# Patient Record
Sex: FEMALE | Race: WHITE | NOT HISPANIC OR LATINO | Employment: FULL TIME | URBAN - METROPOLITAN AREA
[De-identification: names, ages, dates, MRNs, and addresses within clinical notes are randomized per-mention and may not be internally consistent; named-entity substitution may affect disease eponyms.]

---

## 2017-09-21 ENCOUNTER — ANESTHESIA EVENT (OUTPATIENT)
Dept: GASTROENTEROLOGY | Facility: HOSPITAL | Age: 52
End: 2017-09-21
Payer: COMMERCIAL

## 2017-09-21 RX ORDER — MELATONIN
1000 DAILY
COMMUNITY
End: 2018-12-18

## 2017-09-21 RX ORDER — CABERGOLINE 0.5 MG/1
0.25 TABLET ORAL 2 TIMES WEEKLY
COMMUNITY
End: 2018-12-18

## 2017-09-21 RX ORDER — NICOTINE POLACRILEX 2 MG
GUM BUCCAL
COMMUNITY
End: 2018-12-18

## 2017-09-21 RX ORDER — POLYETHYLENE GLYCOL 3350 17 G/17G
17 POWDER, FOR SOLUTION ORAL DAILY
COMMUNITY
End: 2018-12-18

## 2017-09-21 RX ORDER — LANSOPRAZOLE 30 MG/1
30 CAPSULE, DELAYED RELEASE ORAL DAILY
COMMUNITY
End: 2019-12-19 | Stop reason: ALTCHOICE

## 2017-09-22 ENCOUNTER — HOSPITAL ENCOUNTER (OUTPATIENT)
Facility: HOSPITAL | Age: 52
Setting detail: OUTPATIENT SURGERY
Discharge: HOME | End: 2017-09-22
Attending: COLON & RECTAL SURGERY | Admitting: COLON & RECTAL SURGERY
Payer: COMMERCIAL

## 2017-09-22 ENCOUNTER — ANESTHESIA (OUTPATIENT)
Dept: GASTROENTEROLOGY | Facility: HOSPITAL | Age: 52
End: 2017-09-22
Payer: COMMERCIAL

## 2017-09-22 VITALS
BODY MASS INDEX: 24.25 KG/M2 | DIASTOLIC BLOOD PRESSURE: 51 MMHG | HEART RATE: 71 BPM | SYSTOLIC BLOOD PRESSURE: 93 MMHG | HEIGHT: 68 IN | OXYGEN SATURATION: 100 % | WEIGHT: 160 LBS | TEMPERATURE: 99.3 F | RESPIRATION RATE: 18 BRPM

## 2017-09-22 DIAGNOSIS — Z12.11 ENCOUNTER FOR SCREENING FOR MALIGNANT NEOPLASM OF COLON: ICD-10-CM

## 2017-09-22 LAB — EXT PREGNANCY TEST URINE: NEGATIVE

## 2017-09-22 PROCEDURE — 81025 URINE PREGNANCY TEST: CPT | Performed by: ANESTHESIOLOGY

## 2017-09-22 PROCEDURE — 88305 TISSUE EXAM BY PATHOLOGIST: CPT | Performed by: COLON & RECTAL SURGERY

## 2017-09-22 RX ORDER — PROPOFOL 10 MG/ML
INJECTION, EMULSION INTRAVENOUS AS NEEDED
Status: DISCONTINUED | OUTPATIENT
Start: 2017-09-22 | End: 2017-09-22 | Stop reason: SURG

## 2017-09-22 RX ORDER — SODIUM CHLORIDE 9 MG/ML
125 INJECTION, SOLUTION INTRAVENOUS CONTINUOUS
Status: DISCONTINUED | OUTPATIENT
Start: 2017-09-22 | End: 2017-09-22 | Stop reason: HOSPADM

## 2017-09-22 RX ADMIN — PROPOFOL 100 MG: 10 INJECTION, EMULSION INTRAVENOUS at 08:04

## 2017-09-22 RX ADMIN — PROPOFOL 50 MG: 10 INJECTION, EMULSION INTRAVENOUS at 08:07

## 2017-09-22 RX ADMIN — SODIUM CHLORIDE 125 ML/HR: 0.9 INJECTION, SOLUTION INTRAVENOUS at 07:30

## 2017-09-22 RX ADMIN — PROPOFOL 50 MG: 10 INJECTION, EMULSION INTRAVENOUS at 08:12

## 2017-11-14 ENCOUNTER — ALLSCRIPTS OFFICE VISIT (OUTPATIENT)
Dept: OTHER | Facility: OTHER | Age: 52
End: 2017-11-14

## 2017-11-14 ENCOUNTER — LAB REQUISITION (OUTPATIENT)
Dept: LAB | Facility: HOSPITAL | Age: 52
End: 2017-11-14
Payer: COMMERCIAL

## 2017-11-14 DIAGNOSIS — Z01.419 ENCOUNTER FOR GYNECOLOGICAL EXAMINATION WITHOUT ABNORMAL FINDING: ICD-10-CM

## 2017-11-14 DIAGNOSIS — Z12.39 ENCOUNTER FOR OTHER SCREENING FOR MALIGNANT NEOPLASM OF BREAST: ICD-10-CM

## 2017-11-14 PROCEDURE — 87624 HPV HI-RISK TYP POOLED RSLT: CPT | Performed by: OBSTETRICS & GYNECOLOGY

## 2017-11-14 PROCEDURE — G0145 SCR C/V CYTO,THINLAYER,RESCR: HCPCS | Performed by: OBSTETRICS & GYNECOLOGY

## 2017-11-15 NOTE — PROGRESS NOTES
Assessment    1  Gynecologic exam normal (V72 31) (Z01 419)   2  Encounter for other screening for malignant neoplasm of breast (V76 19) (Z12 39)    Plan  Encounter for other screening for malignant neoplasm of breast    · MAMMO SCREENING BILATERAL W 3D & CAD; Status:Hold For - Scheduling; Requestedfor:[id number];    Perform:[facility]; OUS:[id number]; Ordered;for other screening for malignant neoplasm of breast; Ordered By:Escobar Holt;  Gynecologic exam normal    · (1) THIN PREP PAP WITH IMAGING; Status: In Progress - Specimen/DataCollected,Retrospective By Protocol Authorization;   Done: [id number]   Perform:[facility] In Office Collection; JLN:[id number]; Last Updated By:Nina Sanchez; 11/14/2017 9:59:07 AM;Ordered;exam normal; Ordered By:Escobar Holt; Maturation index required? : No  HPV? : Regardless of Interpretation   · Follow-up visit in 1 year Evaluation and Treatment  Follow-up  Status: Hold For -Scheduling  Requested for: [id number]   Ordered;Gynecologic exam normal; Ordered By: Jessenia Hayes Performed:  Due: [id number]    Discussion/Summary  Currently, she eats a healthy diet and has an adequate exercise regimen  the risks and benefits of cervical cancer screening were discussed cervical cancer screening is current Breast cancer screening: the risks and benefits of breast cancer screening were discussed and mammogram has been ordered  Colorectal cancer screening: the risks and benefits of colorectal cancer screening were discussed and colorectal cancer screening is current  Advice and education were given regarding aerobic exercise, weight bearing exercise, calcium supplements and vitamin D supplements       Normal gynecologic examto the office in 1 year'17Dostinex 0 5 mg - 1/2 twice/wk- my be d/c'ed p MRILo LoEstrin - d/c, low threshold to restartq monD Mammo1,000 mg/d with Vit D3/wkasymmetry is due to scarring from the appendectomy- drains were in place and probably caused puckng of the incision- Plastic Surgery consultation done, decided not to pursue scar revision  reviewed 12/2/16  Chief Complaint  pt here for yearly exam  LMP unknown last pap 2014 last mammo 2016  due for co-testing today      History of Present Illness  HPI: Mckay Leon year for an annual visit  is without complaints  MRI will be done of the pituitary next week  Prolactin level was only 1  If the MRI is normal she will discontinue Dostinex  is due also to discontinue Lo Loestrin  She has one period a year since being on the pill  Review of Systems   Constitutional: No fever, no chills, feels well, no tiredness, no recent weight gain or loss-- and-- as noted in HPI   ENT: no ear ache, no loss of hearing, no nosebleeds or nasal discharge, no sore throat or hoarseness  Cardiovascular: no complaints of slow or fast heart rate, no chest pain, no palpitations, no leg claudication or lower extremity edema  Respiratory: no complaints of shortness of breath, no wheezing, no dyspnea on exertion, no orthopnea or PND  Gastrointestinal: constipation-- and-- Takes half a MiraLAX qod, but-- no abdominal pain,-- no nausea,-- no vomiting,-- no diarrhea-- and-- no blood in stools  Genitourinary: Coitus is once a week without problems  No hot flashes or night sweats  uses hyaluronic acid as a lubricant, but-- no complaints of dysuria, no incontinence, no pelvic pain, no dysmenorrhea, no vaginal discharge or abnormal vaginal bleeding  Active Problems    1  Encounter for other screening for malignant neoplasm of breast (V76 19) (Z12 39)   2  Gynecologic exam normal (V72 31) (Z01 419)   3   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)    Past Medical History     Menarche 14 Rupture Appendix- Appendectomy '76 Amenorrhea/Infertility- Prolactinoma, conceive after Parlodel G2, P1- Comp Ab '95, C/S FTP, Prolonged 2nd Stage, Failed Vacuum  8-13 '97 L Torn Meniscus- Arthroscopy 9/06 Nephrolithiasis- lithotripsy, stent  2/07 Rectal Fissure repaired 5/12 Gastritis- EGD Colonoscopy - polyps- repeat 3 ys   The active problems and past medical history were reviewed and updated today  Surgical History     · History of Appendectomy   · History of  Section   · History of Knee Arthroscopy With Lateral Meniscus Repair    Family History  Mother    · Family history of Spinal disorder  Sister    · Family history of congestive heart failure (V17 49) (Z82 49)     MGM- Breast Ca 81, HD MGF- HD PGM- HD M- RA, Cervical Spine disease- Abn Mammo- being evaluated now  MA- Breast Ca 60's S- RA, CHF- Viral Cardiomyopathy 39 F- d Pulmonary Fibrosis 68, Colon Polyps   Social History     · Acute alcohol use (Z72 89)   · Currently sexually active   · Daily caffeine consumption   · Exercises regularly   · Never smoker   · No drug use   · One child  The social history was reviewed and updated today  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    [address] (adopted) learning to drive      Current Meds   1  Biotin 10 MG Oral Tablet; Therapy: (Recorded:2016) to Recorded   2  Cabergoline 0 5 MG Oral Tablet; TAKE 1/2 TABLET 2 TIMES WEEKLY; Therapy: 59CBV6290 to (Last Rx:2016)  Requested for: 21LKZ7369 Ordered   3  Dostinex 0 5 MG TABS; Therapy: (Recorded:2016) to Recorded   4  Hyaluronate Sodium Powder; Therapy: (Recorded:2016) to Recorded   5  Lo Loestrin Fe 1 MG-10 MCG / 10 MCG Oral Tablet; TAKE 1 TABLET DAILY; Therapy: 21NZY5601 to (Angy Martel)  Requested for: 05CQU8834; Last Rx:2016 Ordered   6  Lo Loestrin Fe 1 MG-10 MCG / 10 MCG Oral Tablet; Therapy: (Recorded:2016) to Recorded   7  MiraLax Oral Packet; Therapy: (Recorded:2016) to Recorded   8  Vitamin D3 CAPS; Therapy: (Recorded:2016) to Recorded   9  Zyrtec 10 MG TABS; Therapy: (Recorded:2016) to Recorded    Allergies  1   No Known Drug Allergies    Vitals   Recorded: 71OTY7424 10:06AM   Heart Rate 82 Systolic 931   Diastolic 58   Height 5 ft 8 in   Weight 164 lb 2 oz   BMI Calculated 24 96   BSA Calculated 1 88   O2 Saturation 98   LMP unknown       Physical Exam   Constitutional  General appearance: No acute distress, well appearing and well nourished  Neck  Neck: Normal, supple, trachea midline, no masses  Thyroid: Normal, no thyromegaly  Pulmonary  Respiratory effort: No increased work of breathing or signs of respiratory distress  Auscultation of lungs: Clear to auscultation  Cardiovascular  Auscultation of heart: Normal rate and rhythm, normal S1 and S2, no murmurs  Peripheral vascular exam: Normal pulses Throughout  Genitourinary  External genitalia: Normal and no lesions appreciated  Vagina: Normal, no lesions or dryness appreciated  Urethra: Normal    Urethral meatus: Normal    Bladder: Normal, soft, non-tender and no prolapse or masses appreciated  Cervix: Normal, no palpable masses  Uterus: Normal, non-tender, not enlarged, and no palpable masses  -- RV, small  Adnexa/parametria: Normal, non-tender and no fullness or masses appreciated  Anus, perineum, and rectum: Normal sphincter tone, no masses, and no prolapse  Chest  Breasts: Normal and no dimpling or skin changes noted  Abdomen  Abdomen: Normal, non-tender, and no organomegaly noted  Liver and spleen: No hepatomegaly or splenomegaly  Examination for hernias: No hernias appreciated  Lymphatic  Palpation of lymph nodes in neck, axillae, groin and/or other locations: No lymphadenopathy or masses noted  Skin  Skin and subcutaneous tissue: Abnormal  -- Depression of skin just below appendectomy scar    Palpation of skin and subcutaneous tissue: Normal    Psychiatric  Orientation to person, place, and time: Normal    Mood and affect: Normal        Signatures   Electronically signed by : DUSTIN Flowers ; Nov 14 2017 10:25AM EST                       (Author)

## 2017-11-16 LAB — HPV RRNA GENITAL QL NAA+PROBE: NORMAL

## 2017-11-20 ENCOUNTER — GENERIC CONVERSION - ENCOUNTER (OUTPATIENT)
Dept: OTHER | Facility: OTHER | Age: 52
End: 2017-11-20

## 2017-11-20 LAB
LAB AP GYN PRIMARY INTERPRETATION: NORMAL
Lab: NORMAL

## 2017-12-22 ENCOUNTER — GENERIC CONVERSION - ENCOUNTER (OUTPATIENT)
Dept: GYNECOLOGY | Facility: CLINIC | Age: 52
End: 2017-12-22

## 2018-01-13 VITALS
WEIGHT: 164.13 LBS | HEART RATE: 82 BPM | BODY MASS INDEX: 24.88 KG/M2 | DIASTOLIC BLOOD PRESSURE: 58 MMHG | HEIGHT: 68 IN | OXYGEN SATURATION: 98 % | SYSTOLIC BLOOD PRESSURE: 114 MMHG

## 2018-01-15 NOTE — RESULT NOTES
Verified Results  (1) THIN PREP PAP WITH IMAGING [id number] 09:58AM Genie Null Order Number: [id number]     Test Name Result Flag Reference   LAB AP CASE REPORT (Report)     Gynecologic Cytology Report            Case: [id number]                  Authorizing Provider: Da Benitez MD     Collected:      11/14/2017 0958        First Screen:     DAMARIS Baird Received:      11/15/2017 1345        Specimen:  LIQUID-BASED PAP, SCREENING, Cervix   HPV HIGH RISK RESULT (Report)     HPV, High Risk: HPV NEG, HPV16 NEG, HPV18 NEG      Other High Risk HPV Negative, HPV 16 Negative, HPV 18 Negative  HPV types: 16,18,31,33,35,39,45,51,52,56,58,59,66 and 68 DNA are undetectable or below the pre-set threshold  [employer] FDA approved Vishal 4800 is utilized with strict adherence to the manufacturers instruction  manual to test for the presence of High-Risk HPV DNA, as well as HPV 16 and HPV 18  This instrument  has been validated by our laboratory and/or by the   A negative result does not preclude the presence of HPV infection because results depend on adequate  specimen collection, absence of inhibitors and sufficient DNA to be detected  Additionally, HPV negative  results are not intended to prevent women from proceeding to colposcopy if clinically warranted  Positive HPV test results indicate the presence of any one or more of the high risk types, but since patients  are often co-infected with low-risk types it does not rule out the presence of low-risk types in patients  with mixed infections  LAB AP GYN PRIMARY INTERPRETATION      Negative for intraepithelial lesion or malignancy  Electronically signed by DAMARIS Baird on 11/20/2017 at 2:51 PM   LAB AP GYN SPECIMEN ADEQUACY      Satisfactory for evaluation  Endocervical/transformation zone component present     LAB AP GYN ADDITIONAL INFORMATION (Report)     [employer] FDA approved ,  and ThinPrep Imaging System are   utilized with strict adherence to the 's instruction manual to   prepare gynecologic and non-gynecologic cytology specimens for the   production of ThinPrep slides as well as for gynecologic ThinPrep imaging  These processes have been validated by our laboratory and/or by the     The Pap test is not a diagnostic procedure and should not be used as the   sole means to detect cervical cancer  It is only a screening procedure to   aid in the detection of cervical cancer and its precursors  Both   false-negative and false-positive results have been experienced  Your   patient's test result should be interpreted in this context together with   the history and clinical findings  LAB AP LMP n/a     n/a   LAB AP CASE REPORT (Report)     Gynecologic Cytology Report            Case: [id number]                  Authorizing Provider: Génesis Oquendo MD     Collected:      11/14/2017 0958        First Screen:     DAMARIS [place] Received:      11/15/2017 1345        Specimen:  LIQUID-BASED PAP, SCREENING, Cervix   HPV HIGH RISK RESULT (Report)     HPV, High Risk: HPV NEG, HPV16 NEG, HPV18 NEG      Other High Risk HPV Negative, HPV 16 Negative, HPV 18 Negative  HPV types: 16,18,31,33,35,39,45,51,52,56,58,59,66 and 68 DNA are undetectable or below the pre-set threshold  [employer] FDA approved Vishal 4800 is utilized with strict adherence to the manufacturers instruction  manual to test for the presence of High-Risk HPV DNA, as well as HPV 16 and HPV 18  This instrument  has been validated by our laboratory and/or by the   A negative result does not preclude the presence of HPV infection because results depend on adequate  specimen collection, absence of inhibitors and sufficient DNA to be detected  Additionally, HPV negative  results are not intended to prevent women from proceeding to colposcopy if clinically warranted    Positive HPV test results indicate the presence of any one or more of the high risk types, but since patients  are often co-infected with low-risk types it does not rule out the presence of low-risk types in patients  with mixed infections  LAB AP GYN PRIMARY INTERPRETATION      Negative for intraepithelial lesion or malignancy  Electronically signed by DAMARIS River on 11/20/2017 at 2:51 PM   LAB AP GYN SPECIMEN ADEQUACY      Satisfactory for evaluation  Endocervical/transformation zone component present  LAB AP GYN ADDITIONAL INFORMATION (Report)     [employer]'s FDA approved ,  and ThinPrep Imaging System are   utilized with strict adherence to the 's instruction manual to   prepare gynecologic and non-gynecologic cytology specimens for the   production of ThinPrep slides as well as for gynecologic ThinPrep imaging  These processes have been validated by our laboratory and/or by the     The Pap test is not a diagnostic procedure and should not be used as the   sole means to detect cervical cancer  It is only a screening procedure to   aid in the detection of cervical cancer and its precursors  Both   false-negative and false-positive results have been experienced  Your   patient's test result should be interpreted in this context together with   the history and clinical findings     LAB AP LMP n/a     n/a

## 2018-12-17 PROBLEM — Z12.31 ENCOUNTER FOR SCREENING MAMMOGRAM FOR BREAST CANCER: Status: ACTIVE | Noted: 2018-12-17

## 2018-12-17 PROBLEM — Z01.419 ENCOUNTER FOR ANNUAL ROUTINE GYNECOLOGICAL EXAMINATION: Status: ACTIVE | Noted: 2018-12-17
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
## 2018-12-18 ENCOUNTER — ANNUAL EXAM (OUTPATIENT)
Dept: GYNECOLOGY | Facility: CLINIC | Age: 53
End: 2018-12-18
Payer: COMMERCIAL

## 2018-12-18 VITALS
WEIGHT: 169 LBS | HEIGHT: 68 IN | BODY MASS INDEX: 25.61 KG/M2 | SYSTOLIC BLOOD PRESSURE: 100 MMHG | HEART RATE: 93 BPM | DIASTOLIC BLOOD PRESSURE: 60 MMHG

## 2018-12-18 DIAGNOSIS — Z12.31 ENCOUNTER FOR SCREENING MAMMOGRAM FOR BREAST CANCER: ICD-10-CM

## 2018-12-18 DIAGNOSIS — Z01.419 ENCOUNTER FOR ANNUAL ROUTINE GYNECOLOGICAL EXAMINATION: Primary | ICD-10-CM

## 2018-12-18 PROCEDURE — 99396 PREV VISIT EST AGE 40-64: CPT | Performed by: OBSTETRICS & GYNECOLOGY

## 2018-12-18 RX ORDER — BIOTIN 10000 MCG
CAPSULE ORAL
COMMUNITY
End: 2018-12-18

## 2018-12-18 RX ORDER — DIPHENOXYLATE HYDROCHLORIDE AND ATROPINE SULFATE 2.5; .025 MG/1; MG/1
1 TABLET ORAL DAILY
COMMUNITY

## 2018-12-18 RX ORDER — CETIRIZINE HYDROCHLORIDE 10 MG/1
TABLET ORAL
COMMUNITY

## 2018-12-18 RX ORDER — HYALURONATE SODIUM 100 %
POWDER (GRAM) MISCELLANEOUS
COMMUNITY
End: 2018-12-18

## 2018-12-18 RX ORDER — BIOTIN 1 MG
TABLET ORAL
COMMUNITY
End: 2018-12-18

## 2018-12-18 RX ORDER — CHLORAL HYDRATE 500 MG
4 CAPSULE ORAL DAILY
COMMUNITY

## 2018-12-18 RX ORDER — CABERGOLINE 0.5 MG/1
TABLET ORAL
COMMUNITY
End: 2018-12-18

## 2019-07-08 ENCOUNTER — TELEPHONE (OUTPATIENT)
Dept: GYNECOLOGY | Facility: CLINIC | Age: 54
End: 2019-07-08

## 2019-07-08 NOTE — TELEPHONE ENCOUNTER
P  AM LM   Unlikely to be able to conceive but to be on the safe side I would just use withdrawal which would solve the problem with the condom  Usually recommend contraception until a full year of not having periods

## 2019-07-08 NOTE — TELEPHONE ENCOUNTER
Patient would like to speak with you, was diagnosed with Sjogrens syndrome and wanted to know if you thought she still needed to use a form of birth control? Uses condoms and with her syndrome this makes it hard  Hasn't had a period in 3 months and has occasional hot flashes  Please call
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
## 2019-12-19 ENCOUNTER — ANNUAL EXAM (OUTPATIENT)
Dept: GYNECOLOGY | Facility: CLINIC | Age: 54
End: 2019-12-19
Payer: COMMERCIAL

## 2019-12-19 VITALS
BODY MASS INDEX: 25.91 KG/M2 | DIASTOLIC BLOOD PRESSURE: 60 MMHG | WEIGHT: 171 LBS | SYSTOLIC BLOOD PRESSURE: 110 MMHG | HEIGHT: 68 IN

## 2019-12-19 DIAGNOSIS — Z12.31 ENCOUNTER FOR SCREENING MAMMOGRAM FOR BREAST CANCER: ICD-10-CM

## 2019-12-19 DIAGNOSIS — Z01.419 ENCOUNTER FOR ANNUAL ROUTINE GYNECOLOGICAL EXAMINATION: Primary | ICD-10-CM

## 2019-12-19 DIAGNOSIS — Z86.39 HISTORY OF HYPERPROLACTINEMIA: ICD-10-CM

## 2019-12-19 PROCEDURE — 99396 PREV VISIT EST AGE 40-64: CPT | Performed by: OBSTETRICS & GYNECOLOGY

## 2019-12-19 RX ORDER — LEVOFLOXACIN 500 MG/1
TABLET, FILM COATED ORAL
COMMUNITY
End: 2019-12-19 | Stop reason: ALTCHOICE

## 2019-12-19 RX ORDER — SODIUM FLUORIDE 5 MG/G
GEL, DENTIFRICE DENTAL
COMMUNITY
End: 2022-04-14

## 2019-12-19 RX ORDER — ESOMEPRAZOLE MAGNESIUM 40 MG/1
CAPSULE, DELAYED RELEASE ORAL
COMMUNITY
End: 2019-12-19

## 2019-12-19 RX ORDER — METHYLPREDNISOLONE ACETATE 40 MG/ML
1 INJECTION, SUSPENSION INTRA-ARTICULAR; INTRALESIONAL; INTRAMUSCULAR; SOFT TISSUE
COMMUNITY
End: 2019-12-19 | Stop reason: ALTCHOICE

## 2019-12-19 RX ORDER — CLOBETASOL PROPIONATE 0.46 MG/ML
SOLUTION TOPICAL
COMMUNITY
Start: 2019-12-17

## 2019-12-19 RX ORDER — FAMOTIDINE 40 MG/1
TABLET, FILM COATED ORAL
COMMUNITY

## 2019-12-19 RX ORDER — CYCLOSPORINE 0.5 MG/ML
EMULSION OPHTHALMIC
COMMUNITY

## 2019-12-19 RX ORDER — MONTELUKAST SODIUM 10 MG/1
TABLET ORAL
COMMUNITY
End: 2019-12-19 | Stop reason: ALTCHOICE

## 2019-12-19 RX ORDER — ESOMEPRAZOLE MAGNESIUM 40 MG/1
40 CAPSULE, DELAYED RELEASE ORAL
Refills: 2 | COMMUNITY
Start: 2019-10-07 | End: 2019-12-19 | Stop reason: ALTCHOICE

## 2019-12-19 RX ORDER — OXYCODONE HYDROCHLORIDE AND ACETAMINOPHEN 5; 325 MG/1; MG/1
TABLET ORAL
COMMUNITY
End: 2019-12-19 | Stop reason: ALTCHOICE

## 2019-12-31 DIAGNOSIS — Z12.31 ENCOUNTER FOR SCREENING MAMMOGRAM FOR BREAST CANCER: ICD-10-CM

## 2020-12-15 DIAGNOSIS — Z12.31 ENCOUNTER FOR SCREENING MAMMOGRAM FOR MALIGNANT NEOPLASM OF BREAST: Primary | ICD-10-CM

## 2021-01-05 DIAGNOSIS — Z12.31 ENCOUNTER FOR SCREENING MAMMOGRAM FOR MALIGNANT NEOPLASM OF BREAST: ICD-10-CM
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
## 2021-03-04 ENCOUNTER — ANNUAL EXAM (OUTPATIENT)
Dept: OBGYN CLINIC | Facility: CLINIC | Age: 56
End: 2021-03-04
Payer: COMMERCIAL

## 2021-03-04 VITALS
BODY MASS INDEX: 25.76 KG/M2 | SYSTOLIC BLOOD PRESSURE: 114 MMHG | WEIGHT: 170 LBS | DIASTOLIC BLOOD PRESSURE: 60 MMHG | HEIGHT: 68 IN

## 2021-03-04 DIAGNOSIS — Z01.419 ENCOUNTER FOR ANNUAL ROUTINE GYNECOLOGICAL EXAMINATION: Primary | ICD-10-CM

## 2021-03-04 DIAGNOSIS — Z12.31 ENCOUNTER FOR SCREENING MAMMOGRAM FOR BREAST CANCER: ICD-10-CM

## 2021-03-04 DIAGNOSIS — Z86.39 HISTORY OF HYPERPROLACTINEMIA: ICD-10-CM

## 2021-03-04 PROCEDURE — 99396 PREV VISIT EST AGE 40-64: CPT | Performed by: OBSTETRICS & GYNECOLOGY

## 2022-01-08 ENCOUNTER — NEW PATIENT (OUTPATIENT)
Dept: URBAN - METROPOLITAN AREA CLINIC 6 | Facility: CLINIC | Age: 57
End: 2022-01-08

## 2022-01-08 DIAGNOSIS — H25.813: ICD-10-CM

## 2022-01-08 DIAGNOSIS — H53.19: ICD-10-CM

## 2022-01-08 PROCEDURE — 92004 COMPRE OPH EXAM NEW PT 1/>: CPT

## 2022-01-08 PROCEDURE — 92250 FUNDUS PHOTOGRAPHY W/I&R: CPT

## 2022-01-12 ASSESSMENT — TONOMETRY
OS_IOP_MMHG: 14
OD_IOP_MMHG: 14

## 2022-01-12 ASSESSMENT — VISUAL ACUITY
OD_SC: 20/25
OS_SC: 20/30

## 2022-04-13 PROBLEM — Z86.39 HISTORY OF HYPERPROLACTINEMIA: Status: ACTIVE | Noted: 2022-04-13
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
## 2022-04-14 ENCOUNTER — ANNUAL EXAM (OUTPATIENT)
Dept: OBGYN CLINIC | Facility: CLINIC | Age: 57
End: 2022-04-14
Payer: COMMERCIAL

## 2022-04-14 VITALS
HEIGHT: 68 IN | BODY MASS INDEX: 25.94 KG/M2 | DIASTOLIC BLOOD PRESSURE: 60 MMHG | WEIGHT: 171.2 LBS | SYSTOLIC BLOOD PRESSURE: 104 MMHG

## 2022-04-14 DIAGNOSIS — Z01.419 ENCOUNTER FOR ANNUAL ROUTINE GYNECOLOGICAL EXAMINATION: Primary | ICD-10-CM

## 2022-04-14 DIAGNOSIS — N95.2 ATROPHIC VAGINITIS: ICD-10-CM

## 2022-04-14 DIAGNOSIS — Z12.31 ENCOUNTER FOR SCREENING MAMMOGRAM FOR BREAST CANCER: ICD-10-CM

## 2022-04-14 DIAGNOSIS — Z12.4 SCREENING FOR CERVICAL CANCER: ICD-10-CM

## 2022-04-14 DIAGNOSIS — Z86.39 HISTORY OF HYPERPROLACTINEMIA: ICD-10-CM

## 2022-04-14 DIAGNOSIS — N94.10 DYSPAREUNIA IN FEMALE: ICD-10-CM

## 2022-04-14 DIAGNOSIS — L90.0 LICHEN SCLEROSUS ET ATROPHICUS: ICD-10-CM

## 2022-04-14 PROCEDURE — G0145 SCR C/V CYTO,THINLAYER,RESCR: HCPCS | Performed by: OBSTETRICS & GYNECOLOGY

## 2022-04-14 PROCEDURE — 99396 PREV VISIT EST AGE 40-64: CPT | Performed by: OBSTETRICS & GYNECOLOGY

## 2022-04-14 PROCEDURE — G0476 HPV COMBO ASSAY CA SCREEN: HCPCS | Performed by: OBSTETRICS & GYNECOLOGY

## 2022-04-14 RX ORDER — ESTRADIOL 0.1 MG/G
1 CREAM VAGINAL 2 TIMES WEEKLY
Qty: 42.5 G | Refills: 3 | Status: SHIPPED | OUTPATIENT
Start: 2022-04-14

## 2022-04-15 LAB
HPV HR 12 DNA CVX QL NAA+PROBE: NEGATIVE
HPV16 DNA CVX QL NAA+PROBE: NEGATIVE
HPV18 DNA CVX QL NAA+PROBE: NEGATIVE

## 2022-04-21 LAB
LAB AP GYN PRIMARY INTERPRETATION: NORMAL
Lab: NORMAL

## 2022-04-22 ENCOUNTER — TELEPHONE (OUTPATIENT)
Dept: OBGYN CLINIC | Facility: CLINIC | Age: 57
End: 2022-04-22

## 2022-04-22 NOTE — TELEPHONE ENCOUNTER
----- Message from Mare Rodarte MD sent at 4/21/2022  4:17 PM EDT -----  Results are normal  Please notify patient

## 2022-08-25 RX ORDER — SODIUM CHLORIDE, SODIUM LACTATE, POTASSIUM CHLORIDE, CALCIUM CHLORIDE 600; 310; 30; 20 MG/100ML; MG/100ML; MG/100ML; MG/100ML
125 INJECTION, SOLUTION INTRAVENOUS CONTINUOUS
Status: CANCELLED | OUTPATIENT
Start: 2022-08-25

## 2022-08-25 RX ORDER — LIDOCAINE HYDROCHLORIDE 10 MG/ML
0.5 INJECTION, SOLUTION EPIDURAL; INFILTRATION; INTRACAUDAL; PERINEURAL ONCE AS NEEDED
Status: CANCELLED | OUTPATIENT
Start: 2022-08-25

## 2022-08-26 ENCOUNTER — ANESTHESIA (OUTPATIENT)
Dept: GASTROENTEROLOGY | Facility: HOSPITAL | Age: 57
End: 2022-08-26

## 2022-08-26 ENCOUNTER — ANESTHESIA EVENT (OUTPATIENT)
Dept: GASTROENTEROLOGY | Facility: HOSPITAL | Age: 57
End: 2022-08-26

## 2022-08-26 ENCOUNTER — HOSPITAL ENCOUNTER (OUTPATIENT)
Dept: GASTROENTEROLOGY | Facility: HOSPITAL | Age: 57
Setting detail: OUTPATIENT SURGERY
End: 2022-08-26
Attending: COLON & RECTAL SURGERY
Payer: COMMERCIAL

## 2022-08-26 VITALS
WEIGHT: 171 LBS | SYSTOLIC BLOOD PRESSURE: 114 MMHG | DIASTOLIC BLOOD PRESSURE: 61 MMHG | BODY MASS INDEX: 25.91 KG/M2 | RESPIRATION RATE: 16 BRPM | TEMPERATURE: 96.7 F | OXYGEN SATURATION: 96 % | HEIGHT: 68 IN | HEART RATE: 66 BPM

## 2022-08-26 DIAGNOSIS — Z86.010 PERSONAL HISTORY OF COLONIC POLYPS: ICD-10-CM

## 2022-08-26 DIAGNOSIS — Z12.11 ENCOUNTER FOR SCREENING FOR MALIGNANT NEOPLASM OF COLON: ICD-10-CM

## 2022-08-26 RX ORDER — PROPOFOL 10 MG/ML
INJECTION, EMULSION INTRAVENOUS AS NEEDED
Status: DISCONTINUED | OUTPATIENT
Start: 2022-08-26 | End: 2022-08-26

## 2022-08-26 RX ORDER — LIDOCAINE HYDROCHLORIDE 10 MG/ML
0.5 INJECTION, SOLUTION EPIDURAL; INFILTRATION; INTRACAUDAL; PERINEURAL ONCE AS NEEDED
Status: DISCONTINUED | OUTPATIENT
Start: 2022-08-26 | End: 2022-08-30 | Stop reason: HOSPADM

## 2022-08-26 RX ORDER — ONDANSETRON 2 MG/ML
INJECTION INTRAMUSCULAR; INTRAVENOUS AS NEEDED
Status: DISCONTINUED | OUTPATIENT
Start: 2022-08-26 | End: 2022-08-26

## 2022-08-26 RX ORDER — SODIUM CHLORIDE, SODIUM LACTATE, POTASSIUM CHLORIDE, CALCIUM CHLORIDE 600; 310; 30; 20 MG/100ML; MG/100ML; MG/100ML; MG/100ML
125 INJECTION, SOLUTION INTRAVENOUS CONTINUOUS
Status: DISCONTINUED | OUTPATIENT
Start: 2022-08-26 | End: 2022-08-30 | Stop reason: HOSPADM

## 2022-08-26 RX ADMIN — SODIUM CHLORIDE, SODIUM LACTATE, POTASSIUM CHLORIDE, AND CALCIUM CHLORIDE: .6; .31; .03; .02 INJECTION, SOLUTION INTRAVENOUS at 09:36

## 2022-08-26 RX ADMIN — PROPOFOL 50 MG: 10 INJECTION, EMULSION INTRAVENOUS at 10:13

## 2022-08-26 RX ADMIN — PROPOFOL 100 MG: 10 INJECTION, EMULSION INTRAVENOUS at 10:10

## 2022-08-26 RX ADMIN — ONDANSETRON 4 MG: 2 INJECTION INTRAMUSCULAR; INTRAVENOUS at 10:05

## 2022-08-26 NOTE — ANESTHESIA PREPROCEDURE EVALUATION
Procedure:  COLONOSCOPY    Hx of mild PONV    Relevant Problems   NEURO/PSYCH   (+) History of hyperprolactinemia        Physical Exam    Airway    Mallampati score: I  TM Distance: >3 FB  Neck ROM: full     Dental   No notable dental hx     Cardiovascular  Rhythm: regular, Rate: normal, Cardiovascular exam normal    Pulmonary  Pulmonary exam normal Breath sounds clear to auscultation,     Other Findings        Anesthesia Plan  ASA Score- 2     Anesthesia Type- IV sedation with anesthesia with ASA Monitors  Additional Monitors:   Airway Plan:     Comment: Discussed risks/benefits, including medication reactions, awareness, aspiration, and serious/life threatening complications  Plan to maintain native airway with IVGA, monitored with EtCO2  Plan Factors-Exercise tolerance (METS): >4 METS  Patient summary reviewed  Patient instructed to abstain from smoking on day of procedure  Patient did not smoke on day of surgery  Induction- intravenous  Postoperative Plan-     Informed Consent- Anesthetic plan and risks discussed with patient  I personally reviewed this patient with the CRNA  Discussed and agreed on the Anesthesia Plan with the CRNA  Karrie Nissen

## 2022-08-26 NOTE — INTERVAL H&P NOTE
H&P reviewed  After examining the patient I find no changes in the patients condition since the H&P had been written      Vitals:    08/26/22 0844   BP: 121/76   Pulse: 93   Resp: 16   Temp: 98 °F (36 7 °C)   SpO2: 96%

## 2022-08-26 NOTE — ANESTHESIA POSTPROCEDURE EVALUATION
Post-Op Assessment Note    CV Status:  Stable  Pain Score: 0    Pain management: adequate     Mental Status:  Alert   Hydration Status:  Stable   PONV Controlled:  Controlled   Airway Patency:  Patent      Post Op Vitals Reviewed: Yes      Staff: CRNA         No complications documented      BP   129/63   Temp      Pulse  65   Resp   20   SpO2   99

## 2023-01-09 ENCOUNTER — TELEPHONE (OUTPATIENT)
Dept: OBGYN CLINIC | Facility: CLINIC | Age: 58
End: 2023-01-09

## 2023-01-09 DIAGNOSIS — Z12.31 ENCOUNTER FOR SCREENING MAMMOGRAM FOR BREAST CANCER: ICD-10-CM

## 2023-01-09 NOTE — TELEPHONE ENCOUNTER
----- Message from Johnson Rider MD sent at 1/9/2023 12:31 PM EST -----  Results are normal  Please notify patient

## 2023-04-21 PROBLEM — Z12.31 ENCOUNTER FOR SCREENING MAMMOGRAM FOR MALIGNANT NEOPLASM OF BREAST: Status: ACTIVE | Noted: 2023-04-21

## 2023-05-02 ENCOUNTER — TELEPHONE (OUTPATIENT)
Dept: OBGYN CLINIC | Facility: CLINIC | Age: 58
End: 2023-05-02

## 2023-05-02 NOTE — TELEPHONE ENCOUNTER
----- Message from Pj Lane MD sent at 5/1/2023  5:18 PM EDT -----  Josette Ortega,         I received a CMP result  That stands for complete metabolic profile  These were normal   The fasting blood sugar was elevated at 105    Since it was drawn at 12:51 PM I did not think it was fasting so it is probably normal 
Spoke to pt and reviewed results and recommendations from their labs per  Dr John Cobb  She said it was fasting and drawn about 7:30am      She said her sugar has been known to jump around depending what she had night before she will be watching it  She saw it on her [facility] minerva 
detailed exam

## 2023-06-19 DIAGNOSIS — N95.2 ATROPHIC VAGINITIS: ICD-10-CM

## 2023-06-19 DIAGNOSIS — N94.10 DYSPAREUNIA IN FEMALE: ICD-10-CM

## 2023-06-19 RX ORDER — ESTRADIOL 0.1 MG/G
CREAM VAGINAL
Qty: 42.5 G | Refills: 1 | Status: SHIPPED | OUTPATIENT
Start: 2023-06-19

## 2024-01-11 ENCOUNTER — TELEPHONE (OUTPATIENT)
Dept: OBGYN CLINIC | Facility: CLINIC | Age: 59
End: 2024-01-11

## 2024-01-11 DIAGNOSIS — Z91.89 INCREASED RISK OF BREAST CANCER: Primary | ICD-10-CM

## 2024-01-11 DIAGNOSIS — Z12.31 ENCOUNTER FOR SCREENING MAMMOGRAM FOR MALIGNANT NEOPLASM OF BREAST: ICD-10-CM

## 2024-01-11 NOTE — TELEPHONE ENCOUNTER
Called pt- informed of mammogram , wnl;  reviewed cancer screening tool & lifetime risk of developing breast CA.  Also, informed of recommendation for annual mammo & annual Breast MRI which should be 6 mos apart.  Advised pt to call insurance company to ensure this is covered, then can call central scheduling to schedule MRI.

## 2024-01-11 NOTE — RESULT ENCOUNTER NOTE
Mammogram is normal. However, the cancer screening tool suggests your lifetime risk is 32.66 % of developing Breast Cancer. This is the 1st time [facility] has used the screen. In these cases we recommend imaging twice yearly- Annual Mammograms and Annual Breast MRIs which should be 6 months apart. An order was placed for the MRI in 6 months. Please contact your insurance company for coverage of this service.

## 2024-01-11 NOTE — TELEPHONE ENCOUNTER
----- Message from Escobar Holt MD sent at 1/11/2024  4:03 PM EST -----  Mammogram is normal. However, the cancer screening tool suggests your lifetime risk is 32.66 % of developing Breast Cancer. This is the 1st time [facility] has used the screen. In these cases we recommend imaging twice yearly- Annual Mammograms and Annual Breast MRIs which should be 6 months apart. An order was placed for the MRI in 6 months. Please contact your insurance company for coverage of this service.

## 2024-02-21 PROBLEM — Z01.419 ENCOUNTER FOR ANNUAL ROUTINE GYNECOLOGICAL EXAMINATION: Status: RESOLVED | Noted: 2018-12-17 | Resolved: 2024-02-21

## 2024-03-06 NOTE — PROGRESS NOTES
Assessment/Plan:  Simple sebaceous cyst of the left labia  Successful I&D  Instructions given  Warm soaks at bedtime for the next 3 nights  Abstain from intercourse 1 week  Return to the office 1 month for her annual visit       Diagnoses and all orders for this visit:    Vulvar cyst  -     Incision and drain    Atrophic vaginitis    Lichen sclerosus et atrophicus    Other orders  -     pantoprazole (PROTONIX) 40 mg tablet; 1 (ONE) TABLET BY MOUTH BEFORE BREAKFAST  -     Azelaic Acid (Finacea) 15 % cream; Apply topically 2 (two) times a day              Subjective:        Patient ID: Emmanuelle White is a 58 y.o. female.    Emmanuelle presents today with a 1 week history of a left vulvar nodule.  Her  actually noticed it during intercourse. It has diminished over the past week but is still present.  It is slightly less uncomfortable than it was when she first noticed it.  She noticed it when wiping herself after urinating.  It was not related to intercourse or any kind of trauma.  She is anxious because she has the urge to pop it and she does not want to do anything to make it worse.  She has not used any warm soaks.  On 1 occasion she could not for self and she did compress it.  She would like it drained.    This is not related to the lichen sclerosus or atrophic vaginitis that she has.  She is compliant with Estrace which she takes twice a week . She is currently being evaluated for abdominal pain.  The tentative diagnosis is celiac disease.        The following portions of the patient's history were reviewed and updated as appropriate: She  has a past medical history of Autoimmune disease (HCC), Colon polyp, Digestive problems, Kidney stone, Prolactinoma (HCC), Sjogren's syndrome (HCC) (3/15/19), and Skin cancer.  Patient Active Problem List    Diagnosis Date Noted    Encounter for screening mammogram for malignant neoplasm of breast 04/21/2023    Atrophic vaginitis 04/14/2022    Dyspareunia in female 04/14/2022     Lichen sclerosus et atrophicus 04/14/2022    History of hyperprolactinemia 04/13/2022    Encounter for screening mammogram for breast cancer 12/17/2018   PMH:  Menarche 14  Rupture Appendix- Appendectomy '76  Amenorrhea/Infertility- Prolactinoma, conceived after Parlodel  G2, P1- Comp Ab '95, C/S FTP, Prolonged 2nd Stage, Failed Vacuum 8-13 '97  L Torn Meniscus- Arthroscopy 9/06  Nephrolithiasis- lithotripsy, stent 2/07  Rectal Fissure repaired 5/12  Gastritis- EGD  Colonoscopy 9/17- polyps- repeat 3 ys       Dry Eyes 12/17      Sjogren's 3/19      Menopause 4/19      OA - Back and hands '19      L knee arthroscopy- torn medial meniscus 10/19      Concussion 8/21- fall after vasovagal reaction at night in the bathroom.      AV/Dyspareunia - Estrace '22      Basal cell cancer - right temple area 12/21  She  has a past surgical history that includes FISSURECTOMY; Appendectomy; Cystoscopy; Knee arthroscopy (Left); and pr colonoscopy flx dx w/collj spec when pfrmd (N/A, 9/22/2017).  Her family history includes Breast cancer additional onset in her maternal grandmother; Heart attack in her maternal grandmother; Heart disease in her paternal grandmother; Heart failure in her sister; No Known Problems in her brother, brother, maternal grandfather, paternal grandfather, and son; Pulmonary fibrosis in her father; Rheum arthritis in her sister.  She  reports that she has never smoked. She has never used smokeless tobacco. She reports current alcohol use of about 4.0 standard drinks of alcohol per week. She reports that she does not use drugs.  Current Outpatient Medications   Medication Sig Dispense Refill    Azelaic Acid (Finacea) 15 % cream Apply topically 2 (two) times a day      cetirizine (ZyrTEC) 10 mg tablet Take by mouth      clobetasol (TEMOVATE) 0.05 % external solution       Collagen-Vitamin C-Biotin (COLLAGEN 1500/C PO) Take by mouth      cycloSPORINE (RESTASIS) 0.05 % ophthalmic emulsion Restasis 0.05 % eye  drops in a dropperette      estradiol (ESTRACE) 0.1 mg/g vaginal cream INSERT 1 GRAM VAGINALLY TWICE A WEEK 42.5 g 1    multivitamin (THERAGRAN) TABS Take 1 tablet by mouth daily      Omega-3 1000 MG CAPS Take 4 capsules by mouth daily      pantoprazole (PROTONIX) 40 mg tablet 1 (ONE) TABLET BY MOUTH BEFORE BREAKFAST      Probiotic Product (PROBIOTIC-10 PO) Take by mouth      TURMERIC PO Take by mouth      Cholecalciferol 25 MCG (1000 UT) capsule Take 1,000 Units by mouth daily (Patient not taking: Reported on 3/7/2024)      famotidine (PEPCID) 40 MG tablet famotidine 40 mg tablet (Patient not taking: Reported on 3/7/2024)       No current facility-administered medications for this visit.     Current Outpatient Medications on File Prior to Visit   Medication Sig    Azelaic Acid (Finacea) 15 % cream Apply topically 2 (two) times a day    cetirizine (ZyrTEC) 10 mg tablet Take by mouth    clobetasol (TEMOVATE) 0.05 % external solution     Collagen-Vitamin C-Biotin (COLLAGEN 1500/C PO) Take by mouth    cycloSPORINE (RESTASIS) 0.05 % ophthalmic emulsion Restasis 0.05 % eye drops in a dropperette    estradiol (ESTRACE) 0.1 mg/g vaginal cream INSERT 1 GRAM VAGINALLY TWICE A WEEK    multivitamin (THERAGRAN) TABS Take 1 tablet by mouth daily    Omega-3 1000 MG CAPS Take 4 capsules by mouth daily    pantoprazole (PROTONIX) 40 mg tablet 1 (ONE) TABLET BY MOUTH BEFORE BREAKFAST    Probiotic Product (PROBIOTIC-10 PO) Take by mouth    TURMERIC PO Take by mouth    Cholecalciferol 25 MCG (1000 UT) capsule Take 1,000 Units by mouth daily (Patient not taking: Reported on 3/7/2024)    famotidine (PEPCID) 40 MG tablet famotidine 40 mg tablet (Patient not taking: Reported on 3/7/2024)     No current facility-administered medications on file prior to visit.     She is allergic to other and soybean oil - food allergy..    Review of Systems   Constitutional: Negative.  Negative for chills and fever.   Genitourinary:  Positive for genital  "sores. Negative for difficulty urinating, dyspareunia, dysuria, pelvic pain, vaginal bleeding and vaginal discharge.         Objective:    Vitals:    03/07/24 1056   BP: 108/74   BP Location: Left arm   Patient Position: Sitting   Cuff Size: Standard   Weight: 77 kg (169 lb 12.8 oz)            Physical Exam  Vitals and nursing note reviewed. Exam conducted with a chaperone present.   Constitutional:       Appearance: Normal appearance. She is normal weight.   Genitourinary:     Exam position: Lithotomy position.          Comments: 5 mm raised nodule compatible with a sebaceous cyst within the left vulva - inner labia minora  Skin:     General: Skin is warm and dry.   Neurological:      Mental Status: She is alert.       Incision and drain    Date/Time: 3/7/2024 11:15 AM    Performed by: Escobar Holt MD  Authorized by: Escobar Holt MD  Universal Protocol:  Consent: Verbal consent obtained.  Risks and benefits: risks, benefits and alternatives were discussed  Consent given by: patient  Time out: Immediately prior to procedure a \"time out\" was called to verify the correct patient, procedure, equipment, support staff and site/side marked as required.  Patient understanding: patient states understanding of the procedure being performed (Patient requested the procedure to be performed)  Patient consent: the patient's understanding of the procedure matches consent given  Patient identity confirmed: verbally with patient    Patient location:  Clinic  Location:     Type:  Cyst    Location:  Anogenital    Anogenital location:  Vulva  Pre-procedure details:     Skin preparation:  Betadine  Anesthesia (see MAR for exact dosages):     Anesthesia method:  Local infiltration    Local anesthetic:  Lidocaine 1% WITH epi (3 cc)  Procedure details:     Complexity:  Simple    Needle aspiration: no      Incision types:  Cruciate    Scalpel blade:  11    Approach:  Open    Incision depth:  Submucosal    Wound management:  Probed and " deloculated    Drainage:  Purulent    Drainage amount:  Scant    Wound treatment:  Wound left open    Packing materials:  None  Post-procedure details:     Patient tolerance of procedure:  Tolerated well, no immediate complications

## 2024-03-07 ENCOUNTER — OFFICE VISIT (OUTPATIENT)
Dept: OBGYN CLINIC | Facility: CLINIC | Age: 59
End: 2024-03-07

## 2024-03-07 VITALS — SYSTOLIC BLOOD PRESSURE: 108 MMHG | WEIGHT: 169.8 LBS | DIASTOLIC BLOOD PRESSURE: 74 MMHG | BODY MASS INDEX: 26.65 KG/M2

## 2024-03-07 DIAGNOSIS — N95.2 ATROPHIC VAGINITIS: ICD-10-CM

## 2024-03-07 DIAGNOSIS — L90.0 LICHEN SCLEROSUS ET ATROPHICUS: ICD-10-CM

## 2024-03-07 DIAGNOSIS — N90.7 VULVAR CYST: Primary | ICD-10-CM

## 2024-03-07 RX ORDER — PANTOPRAZOLE SODIUM 40 MG/1
TABLET, DELAYED RELEASE ORAL
COMMUNITY
Start: 2023-11-27

## 2024-03-07 RX ORDER — AZELAIC ACID 0.15 G/G
GEL TOPICAL 2 TIMES DAILY
COMMUNITY

## 2024-04-22 DIAGNOSIS — N95.2 ATROPHIC VAGINITIS: ICD-10-CM

## 2024-04-22 DIAGNOSIS — N94.10 DYSPAREUNIA IN FEMALE: ICD-10-CM

## 2024-04-22 RX ORDER — ESTRADIOL 0.1 MG/G
CREAM VAGINAL
Qty: 42.5 G | Refills: 1 | Status: SHIPPED | OUTPATIENT
Start: 2024-04-22 | End: 2024-04-25 | Stop reason: SDUPTHER

## 2024-04-24 PROBLEM — Z91.89 INCREASED RISK OF BREAST CANCER: Status: ACTIVE | Noted: 2024-04-24

## 2024-04-24 PROBLEM — Z01.419 ENCOUNTER FOR ANNUAL ROUTINE GYNECOLOGICAL EXAMINATION: Status: ACTIVE | Noted: 2024-04-24
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
## 2024-04-25 ENCOUNTER — ANNUAL EXAM (OUTPATIENT)
Dept: OBGYN CLINIC | Facility: CLINIC | Age: 59
End: 2024-04-25
Payer: COMMERCIAL

## 2024-04-25 VITALS
HEIGHT: 67 IN | SYSTOLIC BLOOD PRESSURE: 104 MMHG | DIASTOLIC BLOOD PRESSURE: 78 MMHG | BODY MASS INDEX: 26.53 KG/M2 | WEIGHT: 169 LBS

## 2024-04-25 DIAGNOSIS — Z91.89 INCREASED RISK OF BREAST CANCER: ICD-10-CM

## 2024-04-25 DIAGNOSIS — Z12.31 ENCOUNTER FOR SCREENING MAMMOGRAM FOR MALIGNANT NEOPLASM OF BREAST: ICD-10-CM

## 2024-04-25 DIAGNOSIS — M85.89 OSTEOPENIA OF MULTIPLE SITES: ICD-10-CM

## 2024-04-25 DIAGNOSIS — L90.0 LICHEN SCLEROSUS ET ATROPHICUS: ICD-10-CM

## 2024-04-25 DIAGNOSIS — Z01.419 ENCOUNTER FOR ANNUAL ROUTINE GYNECOLOGICAL EXAMINATION: Primary | ICD-10-CM

## 2024-04-25 DIAGNOSIS — N95.2 ATROPHIC VAGINITIS: ICD-10-CM

## 2024-04-25 DIAGNOSIS — N94.10 DYSPAREUNIA IN FEMALE: ICD-10-CM

## 2024-04-25 PROCEDURE — 99396 PREV VISIT EST AGE 40-64: CPT | Performed by: OBSTETRICS & GYNECOLOGY

## 2024-04-25 RX ORDER — ESTRADIOL 0.1 MG/G
1 CREAM VAGINAL 2 TIMES WEEKLY
Qty: 42.5 G | Refills: 3 | Status: SHIPPED | OUTPATIENT
Start: 2024-04-25

## 2024-04-25 RX ORDER — SUCRALFATE ORAL 1 G/10ML
SUSPENSION ORAL
COMMUNITY
Start: 2024-04-24

## 2024-05-24 PROBLEM — Z01.419 ENCOUNTER FOR ANNUAL ROUTINE GYNECOLOGICAL EXAMINATION: Status: RESOLVED | Noted: 2024-04-24 | Resolved: 2024-05-24

## 2024-08-20 ENCOUNTER — HOSPITAL ENCOUNTER (OUTPATIENT)
Dept: RADIOLOGY | Facility: HOSPITAL | Age: 59
Discharge: HOME | End: 2024-08-20
Attending: OBSTETRICS & GYNECOLOGY
Payer: COMMERCIAL

## 2024-08-20 DIAGNOSIS — Z91.89 INCREASED RISK OF BREAST CANCER: ICD-10-CM

## 2024-08-20 PROCEDURE — C8937 CAD BREAST MRI: HCPCS

## 2024-08-20 PROCEDURE — G1004 CDSM NDSC: HCPCS

## 2024-08-20 PROCEDURE — C8908 MRI W/O FOL W/CONT, BREAST,: HCPCS

## 2024-08-20 PROCEDURE — A9585 GADOBUTROL INJECTION: HCPCS | Performed by: OBSTETRICS & GYNECOLOGY

## 2024-08-20 RX ORDER — GADOBUTROL 604.72 MG/ML
7 INJECTION INTRAVENOUS
Status: COMPLETED | OUTPATIENT
Start: 2024-08-20 | End: 2024-08-20

## 2024-08-20 RX ADMIN — GADOBUTROL 7 ML: 604.72 INJECTION INTRAVENOUS at 16:54

## 2024-09-19 ENCOUNTER — HOSPITAL ENCOUNTER (OUTPATIENT)
Dept: MRI IMAGING | Facility: HOSPITAL | Age: 59
End: 2024-09-19
Payer: COMMERCIAL

## 2024-09-19 DIAGNOSIS — R22.2 LOCALIZED SWELLING, MASS AND LUMP, TRUNK: ICD-10-CM

## 2024-09-19 PROCEDURE — A9585 GADOBUTROL INJECTION: HCPCS | Performed by: RADIOLOGY

## 2024-09-19 PROCEDURE — 71552 MRI CHEST W/O & W/DYE: CPT

## 2024-09-19 RX ORDER — GADOBUTROL 604.72 MG/ML
7 INJECTION INTRAVENOUS
Status: COMPLETED | OUTPATIENT
Start: 2024-09-19 | End: 2024-09-19

## 2024-09-19 RX ADMIN — GADOBUTROL 7 ML: 604.72 INJECTION INTRAVENOUS at 19:52

## 2024-09-23 DIAGNOSIS — N94.10 DYSPAREUNIA IN FEMALE: ICD-10-CM

## 2024-09-23 DIAGNOSIS — N95.2 ATROPHIC VAGINITIS: ICD-10-CM

## 2024-09-24 RX ORDER — ESTRADIOL 0.1 MG/G
1 CREAM VAGINAL 2 TIMES WEEKLY
Qty: 42.5 G | Refills: 0 | Status: SHIPPED | OUTPATIENT
Start: 2024-09-26

## 2024-09-24 NOTE — TELEPHONE ENCOUNTER
Appt on 5/23/25 with you. Previous Dr. Holt pt and requesting refill. She is due and [facility] cancelled it for some reason. She want to know if we can [facility] on [facility]. Confirmed with patient. Want 3 month supply.

## 2024-10-03 ENCOUNTER — TELEPHONE (OUTPATIENT)
Age: 59
End: 2024-10-03

## 2024-10-03 NOTE — TELEPHONE ENCOUNTER
Caller: Emmanuelle    Doctor: Brooks    Reason for call: Patient called to make an appointment for a sternum lesion. Called office to confirm if the Dr. Would see patient. Office will call patient back once Dr reviews MRI.    Call back#: [phone]

## 2024-10-10 ENCOUNTER — OFFICE VISIT (OUTPATIENT)
Dept: OBGYN CLINIC | Facility: MEDICAL CENTER | Age: 59
End: 2024-10-10
Payer: COMMERCIAL

## 2024-10-10 VITALS
HEART RATE: 96 BPM | SYSTOLIC BLOOD PRESSURE: 124 MMHG | BODY MASS INDEX: 26.37 KG/M2 | WEIGHT: 168 LBS | DIASTOLIC BLOOD PRESSURE: 86 MMHG | HEIGHT: 67 IN

## 2024-10-10 DIAGNOSIS — D48.0 NEOPLASM OF UNCERTAIN BEHAVIOR OF BONE AND ARTICULAR CARTILAGE: Primary | ICD-10-CM

## 2024-10-10 PROBLEM — M19.90 OSTEOARTHRITIS: Status: ACTIVE | Noted: 2024-10-10

## 2024-10-10 PROBLEM — K90.0 CELIAC DISEASE: Status: ACTIVE | Noted: 2024-03-04

## 2024-10-10 PROBLEM — R55 SYNCOPE: Status: ACTIVE | Noted: 2021-09-02

## 2024-10-10 PROBLEM — D35.2 PITUITARY ADENOMA (HCC): Status: ACTIVE | Noted: 2024-10-10

## 2024-10-10 PROBLEM — G89.29 CHRONIC LEFT SHOULDER PAIN: Status: ACTIVE | Noted: 2021-02-04

## 2024-10-10 PROBLEM — L40.9 PSORIASIS: Status: ACTIVE | Noted: 2021-12-10

## 2024-10-10 PROBLEM — C44.310 BASAL CELL CARCINOMA (BCC) OF FACE: Status: ACTIVE | Noted: 2021-12-10

## 2024-10-10 PROBLEM — G56.03 CARPAL TUNNEL SYNDROME, BILATERAL: Status: ACTIVE | Noted: 2019-07-18

## 2024-10-10 PROBLEM — M35.08: Status: ACTIVE | Noted: 2019-03-13

## 2024-10-10 PROBLEM — Z86.0100 HISTORY OF COLONIC POLYPS: Status: ACTIVE | Noted: 2024-10-10

## 2024-10-10 PROBLEM — M25.512 CHRONIC LEFT SHOULDER PAIN: Status: ACTIVE | Noted: 2021-02-04

## 2024-10-10 PROBLEM — K21.9 GASTROESOPHAGEAL REFLUX DISEASE WITHOUT ESOPHAGITIS: Status: ACTIVE | Noted: 2019-03-20

## 2024-10-10 PROBLEM — Z78.0 POST-MENOPAUSAL: Status: ACTIVE | Noted: 2019-07-18

## 2024-10-10 PROBLEM — E78.00 ELEVATED LDL CHOLESTEROL LEVEL: Status: ACTIVE | Noted: 2022-08-24

## 2024-10-10 PROBLEM — R10.13 EPIGASTRIC PAIN: Status: ACTIVE | Noted: 2024-10-10

## 2024-10-10 PROBLEM — R14.0 BLOATING: Status: ACTIVE | Noted: 2024-10-10

## 2024-10-10 PROBLEM — G56.01 CARPAL TUNNEL SYNDROME OF RIGHT WRIST: Status: ACTIVE | Noted: 2021-11-22

## 2024-10-10 PROBLEM — N20.0 CALCULUS OF KIDNEY: Status: ACTIVE | Noted: 2024-10-10

## 2024-10-10 PROBLEM — R25.3 MUSCLE FASCICULATION: Status: ACTIVE | Noted: 2019-07-18

## 2024-10-10 PROBLEM — R14.2 FUNCTIONAL BELCHING DISORDER: Status: ACTIVE | Noted: 2024-10-10

## 2024-10-10 PROBLEM — K58.2 IRRITABLE BOWEL SYNDROME WITH BOTH CONSTIPATION AND DIARRHEA: Status: ACTIVE | Noted: 2024-10-10

## 2024-10-10 PROBLEM — R21 RASH: Status: ACTIVE | Noted: 2020-07-23

## 2024-10-10 PROBLEM — K29.30 CHRONIC SUPERFICIAL GASTRITIS WITHOUT BLEEDING: Status: ACTIVE | Noted: 2019-07-18

## 2024-10-10 PROCEDURE — 99204 OFFICE O/P NEW MOD 45 MIN: CPT | Performed by: STUDENT IN AN ORGANIZED HEALTH CARE EDUCATION/TRAINING PROGRAM

## 2024-10-10 NOTE — LETTER
October 10, 2024     Na Leary DO  [address]  [address]  [place] [address]    Patient: Emmanuelle White   YOB: 1965   Date of Visit: 10/10/2024       Dr. Leary,    Thank you for entrusting your patient's care to me and [facility].  I am writing to inform you that I have evaluated your patient and recommended the following treatment plan for Emmanuelle White :    We plan to proceed with MRI with/without contrast in 6 months to monitor for change in size. Also, a whole body bone scan was ordered to rule out multifocal process.     I am confident these actions will produce optimal patient outcomes.  I will continue to provide our mutual patient with high quality care here at [facility].    My team and I are here for any questions or concerns you may have regarding this patient or other orthopedic oncology inquiries. Please contact me at my personal cell, [phone], if needed. It has been a privilege to take care of our mutual patient's health care needs.  I greatly value our partnership and thank you for the opportunity to care for your patients.      In good health,    Paul Guy DO  Orthopedic Oncologist and Sarcoma Surgeon  [address], [address], [place], [place]  Phone: [phone] Fax: [phone]           CC: No Recipients    Ray Mckeon PA-C  10/10/2024  6:55 PM  Sign when Signing Visit  Orthopedic Oncology Surgery Office Note  Emmanuelle White (59 y.o. female)  : 1965 Encounter Date: 10/10/2024  Dr. Paul Guy DO, Orthopedic Surgeon  Orthopedic Oncology & Sarcoma Surgery   Phone:[phone] Fax:[phone]    Assessment, Plan, & Discussion:   Emmanuelle White is a 59 y.o. female with:    1. 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   - Discussed at length likely benign finding based off of imaging  - Likely pain is related to underlying GERD vs superficial irritation and not the lesion  - Patient can  WBAT, no restrictions in activity  - Patient is cleared to return to referring physician to pursue PT/injection therapy,etc   - Recommending MRI  w/woc in 6 months to r/o change in size and appearance as well as WBBS to r/o multifocal process   -if lesion changes in size/appearance- biopsy may be indicated   - if WBBS is positive for uptake in other areas- additional scans may be indicated  -All questions answered    Surgical Planning:   No surgery planned at this time    Follow Up & Tasks:     No follow-ups on file.     Tasks:  MRI w wo chest wall  NM whole body bone scan   Return after follow up MRI  ___________________________________________________________________________    History of Present Illness:     Emmanuelle White is a 59 y.o. female with history of incidental finding of sternal lesion who presents for consultation at the suggestion of Dr. Na Leary DO with [facility] regarding incidental finding of cartilaginous-appearing lesion of sternum    Family history of breast cancer- maternal grandmother, aunt, mother with breast caner. OBGYN ordered breast MRI.     1 year ago, GI issues. Difficulty wearing a bra prior, throught to be GERD. Sx manifested more as chest pain/pressure at night     Hx sjogrens, DXA for rheum, dx osteopenia but only conservative measures     At baseline patient gaits without assistance.  No noted constitutional symptoms such as fever, chills, night sweats, fatigue, weight gains/losses. No noted chest pain/shortness of breath.    Hx BCC, mohs and removed    Review of Systems:   Allergies, medications, past medical/surgical/family/social history have been reviewed.  Complete 12 system review performed and found to be negative except: except as per mentioned in HPI.    Oncology and Treatment History:      Review of referring provider's records:  Referring provider: Dr. Na Leary DO  Date: 9/6/24  Impression:   Pt had a routine breast MRI as her mother passed  "away of cancer so she is high risk. MRI was clear but did show a sternal lesion which prompted her PCP to order a CT scan which was indeterminate so they are recommending a MRI of the chest.   Pt wanted to make provider aware and see if she had any thoughts on testing or if this could be autoimmune related.   Confirmed pt's Sjogrens is stable and most of her symptoms she gets are GI related     Patient Care team:   Patient Care Team:  July Castillo DO as PCP - General  MD Devon Chaney MD as Endoscopist     Oncology History    No history exists.       Physical Examination:     Height: 5' 7\" (170.2 cm)  Weight - Scale: 76.2 kg (168 lb)  BMI (Calculated): 26.3  BSA (Calculated - m2): 1.88 sq meters     Vitals:    10/10/24 1708   BP: 124/86   Pulse: 96     Body mass index is 26.31 kg/m².    General: alert and oriented; well nourished/well developed; no apparent distress.   Present unaccompanied today  Psychiatric: normal mood and affect  HEENT: NCAT. Head/neck - full range of motion.   Lungs: breathing comfortably; equal symmetric chest expansion.   Abdomen: soft, non-tender, non-distended.   Skin: warm; dry; no lesions, rashes, petechiae or purpura; no clubbing, no cyanosis, no edema    Extremity: sternum   Inspection: no edema, or gross deformities. Denies skin abnormalities   Palpation: no palpable masses or lesions   Range of motion of joints: WFL range of motion all extremities.   Motor strength: WNL all extremities.  intact.    Sensation: grossly intact to all extremities.    Pulses: intact   Lymphatics: (no obvious) lymphadenopathy  Gait: normal gait.      Imaging Results:   All images personally review today by Dr. Guy    Study: MRI chest w wo  Date: 9/19/24  Report: I have read and agree with the radiologist report. and I have personally reviewed the imaging in PACS and my impression is as follows:  My impression is as follows:   A 0.9 cm benign chondroid lesion (enchondroma) in the " sternum, corresponding to the lesion seen on prior breast MRI. No aggressive imaging features.     Study: CT chest wo  Date: 9/3/24  Report: I have read and agree with the radiologist report. and I have personally reviewed the imaging in PACS and my impression is as follows:  My impression is as follows:   Lucency in the lower sternum with ill-defined margins, of uncertain etiology.   The absence of a history of myeloma or known metastases, this could potentially   be further evaluated by MRI of the chest with contrast.   CT examination performed with dose lowering protocol in accordance with ALARA.     Study: MRI breast bilateral   Date: 8/282/24  Report: I have read and agree with the radiologist report. and I have personally reviewed the imaging in PACS and my impression is as follows:  My impression is as follows:   Indeterminate 9 mm sternal lesion as detailed above. It appears that no prior CT or MRI of this region had been performed at our institution or at [facility] for comparison. Would confirm with the patient that she has not had CT or MRI of the chest. If no prior imaging has been performed, recommend dedicated CT for further evaluation.     Pathology & Pertinent Laboratory Findings:      N/A      Medical, Surgical, Family, and Social History      Past Medical History:   Diagnosis Date   • Autoimmune disease (HCC)    • Colon polyp    • Digestive problems    • Kidney stone    • Prolactinoma (HCC)    • Sjogren's syndrome (HCC) 3/15/19   • Skin cancer      Past Surgical History:   Procedure Laterality Date   • APPENDECTOMY     • CYSTOSCOPY     • FISSURECTOMY     • KNEE ARTHROSCOPY Left     knee meniscus repair   • MD COLONOSCOPY FLX DX W/COLLJ SPEC WHEN PFRMD N/A 9/22/2017    Procedure: COLONOSCOPY with polypectomy;  Surgeon: Devon Gil MD;  Location: [facility];  Service: Colorectal       Current Outpatient Medications:   •  Azelaic Acid (Finacea) 15 % cream, Apply topically 2 (two) times a day, Disp:  , Rfl:   •  cetirizine (ZyrTEC) 10 mg tablet, Take by mouth, Disp: , Rfl:   •  clobetasol (TEMOVATE) 0.05 % external solution, , Disp: , Rfl:   •  cycloSPORINE (RESTASIS) 0.05 % ophthalmic emulsion, Restasis 0.05 % eye drops in a dropperette, Disp: , Rfl:   •  estradiol (ESTRACE) 0.1 mg/g vaginal cream, Insert 1 g into the vagina 2 (two) times a week, Disp: 42.5 g, Rfl: 0  •  multivitamin (THERAGRAN) TABS, Take 1 tablet by mouth daily, Disp: , Rfl:   •  pantoprazole (PROTONIX) 40 mg tablet, 1 (ONE) TABLET BY MOUTH BEFORE BREAKFAST, Disp: , Rfl:   •  sucralfate (CARAFATE) 1 g/10 mL suspension, , Disp: , Rfl:   •  Cholecalciferol 25 MCG (1000 UT) capsule, Take 1,000 Units by mouth daily (Patient not taking: Reported on 3/7/2024), Disp: , Rfl:   •  Collagen-Vitamin C-Biotin (COLLAGEN 1500/C PO), Take by mouth (Patient not taking: Reported on 4/25/2024), Disp: , Rfl:   •  famotidine (PEPCID) 40 MG tablet, , Disp: , Rfl:   •  Omega-3 1000 MG CAPS, Take 4 capsules by mouth daily (Patient not taking: Reported on 4/25/2024), Disp: , Rfl:   •  Probiotic Product (PROBIOTIC-10 PO), Take by mouth (Patient not taking: Reported on 4/25/2024), Disp: , Rfl:   •  TURMERIC PO, Take by mouth (Patient not taking: Reported on 4/25/2024), Disp: , Rfl:   Allergies   Allergen Reactions   • Quaternium-15 Rash   • Nuts - Food Allergy Other (See Comments)     Itchy mouth and throat   • Other      Tree nuts   • Soybean Oil - Food Allergy      Family History   Problem Relation Age of Onset   • Breast cancer Mother    • Pulmonary fibrosis Father    • Heart failure Sister    • Rheum arthritis Sister    • No Known Problems Brother    • No Known Problems Brother    • No Known Problems Son    • Breast cancer additional onset Maternal Grandmother    • Heart attack Maternal Grandmother    • Breast cancer Maternal Grandmother    • No Known Problems Maternal Grandfather    • Heart disease Paternal Grandmother    • No Known Problems Paternal Grandfather     • Breast cancer Maternal Aunt    • Colon cancer Neg Hx    • Ovarian cancer Neg Hx    • Uterine cancer Neg Hx    • Cervical cancer Neg Hx      Social History     Socioeconomic History   • Marital status: /Civil Union     Spouse name: Not on file   • Number of children: Not on file   • Years of education: Not on file   • Highest education level: Not on file   Occupational History   • Not on file   Tobacco Use   • Smoking status: Never   • Smokeless tobacco: Never   Vaping Use   • Vaping status: Never Used   Substance and Sexual Activity   • Alcohol use: Yes     Alcohol/week: 4.0 standard drinks of alcohol     Types: 3 Glasses of wine, 1 Shots of liquor per week     Comment: socially   • Drug use: Never   • Sexual activity: Yes     Partners: Male     Birth control/protection: Post-menopausal   Other Topics Concern   • Not on file   Social History Narrative   • Not on file     Social Determinants of Health     Financial Resource Strain: Not on file   Food Insecurity: Not on file   Transportation Needs: Not on file   Physical Activity: Not on file   Stress: Not on file   Social Connections: Not on file   Intimate Partner Violence: Not on file   Housing Stability: Not on file       This Visit:     30 minutes was spent in the coordination of care, reviewing of imaging and with the patient on the date of service    Scribe Attestation      I,:  Ray Mckeon PA-C am acting as a scribe while in the presence of the attending physician.:       I,:  Paul Guy DO personally performed the services described in this documentation    as scribed in my presence.:              Ray Mckeon PA-C   10/10/2024 6:50 PM       Associated Orders:     Problem List Items Addressed This Visit    None  Visit Diagnoses       Neoplasm of uncertain behavior of bone and articular cartilage    -  Primary    Relevant Orders    NM bone scan whole body    MRI chest wall wo and w contrast

## 2024-10-10 NOTE — PATIENT INSTRUCTIONS
You have been sent for additional imaging.  Imaging can take a few weeks to be reviewed by both a radiologist and Dr. Guy personally, additionally any other specialist required. Please call the office at [phone] to ensure your follow-up appointment has been scheduled.

## 2024-10-10 NOTE — PROGRESS NOTES
Orthopedic Oncology Surgery Office Note  Emmanuelle White (59 y.o. female)  : 1965 Encounter Date: 10/10/2024  Dr. Paul Guy DO, Orthopedic Surgeon  Orthopedic Oncology & Sarcoma Surgery   Phone:[phone] Fax:[phone]    Assessment, Plan, & Discussion:   Emmanuelle White is a 59 y.o. female with:    1. 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   - Discussed at length likely benign finding based off of imaging  - Likely pain is related to underlying GERD vs superficial irritation and not the lesion  - Patient can WBAT, no restrictions in activity  - Patient is cleared to return to referring physician to pursue PT/injection therapy,etc   - Recommending MRI  w/woc in 6 months to r/o change in size and appearance as well as WBBS to r/o multifocal process   -if lesion changes in size/appearance- biopsy may be indicated   - if WBBS is positive for uptake in other areas- additional scans may be indicated  -All questions answered    Surgical Planning:   No surgery planned at this time    Follow Up & Tasks:     No follow-ups on file.     Tasks:  MRI w wo chest wall  NM whole body bone scan   Return after follow up MRI  ___________________________________________________________________________    History of Present Illness:     Emmanuelle White is a 59 y.o. female with history of incidental finding of sternal lesion who presents for consultation at the suggestion of Dr. Na Leary DO with [facility] regarding incidental finding of cartilaginous-appearing lesion of sternum    Family history of breast cancer- maternal grandmother, aunt, mother with breast caner. OBGYN ordered breast MRI.     1 year ago, GI issues. Difficulty wearing a bra prior, throught to be GERD. Sx manifested more as chest pain/pressure at night     Hx sjogrens, DXA for rheum, dx osteopenia but only conservative measures     At baseline patient gaits without assistance.  No noted constitutional symptoms such as  "fever, chills, night sweats, fatigue, weight gains/losses. No noted chest pain/shortness of breath.    Hx BCC, mohs and removed    Review of Systems:   Allergies, medications, past medical/surgical/family/social history have been reviewed.  Complete 12 system review performed and found to be negative except: except as per mentioned in HPI.    Oncology and Treatment History:      Review of referring provider's records:  Referring provider: Dr. Na Leary DO  Date: 9/6/24  Impression:   Pt had a routine breast MRI as her mother passed away of cancer so she is high risk. MRI was clear but did show a sternal lesion which prompted her PCP to order a CT scan which was indeterminate so they are recommending a MRI of the chest.   Pt wanted to make provider aware and see if she had any thoughts on testing or if this could be autoimmune related.   Confirmed pt's Sjogrens is stable and most of her symptoms she gets are GI related     Patient Care team:   Patient Care Team:  July Castillo DO as PCP - General  MD Devon Chaney MD as Endoscopist     Oncology History    No history exists.       Physical Examination:     Height: 5' 7\" (170.2 cm)  Weight - Scale: 76.2 kg (168 lb)  BMI (Calculated): 26.3  BSA (Calculated - m2): 1.88 sq meters     Vitals:    10/10/24 1708   BP: 124/86   Pulse: 96     Body mass index is 26.31 kg/m².    General: alert and oriented; well nourished/well developed; no apparent distress.   Present unaccompanied today  Psychiatric: normal mood and affect  HEENT: NCAT. Head/neck - full range of motion.   Lungs: breathing comfortably; equal symmetric chest expansion.   Abdomen: soft, non-tender, non-distended.   Skin: warm; dry; no lesions, rashes, petechiae or purpura; no clubbing, no cyanosis, no edema    Extremity: sternum   Inspection: no edema, or gross deformities. Denies skin abnormalities   Palpation: no palpable masses or lesions   Range of motion of joints: WFL range of " motion all extremities.   Motor strength: WNL all extremities.  intact.    Sensation: grossly intact to all extremities.    Pulses: intact   Lymphatics: (no obvious) lymphadenopathy  Gait: normal gait.      Imaging Results:   All images personally review today by Dr. Guy    Study: MRI chest w wo  Date: 9/19/24  Report: I have read and agree with the radiologist report. and I have personally reviewed the imaging in PACS and my impression is as follows:  My impression is as follows:   A 0.9 cm benign chondroid lesion (enchondroma) in the sternum, corresponding to the lesion seen on prior breast MRI. No aggressive imaging features.     Study: CT chest wo  Date: 9/3/24  Report: I have read and agree with the radiologist report. and I have personally reviewed the imaging in PACS and my impression is as follows:  My impression is as follows:   Lucency in the lower sternum with ill-defined margins, of uncertain etiology.   The absence of a history of myeloma or known metastases, this could potentially   be further evaluated by MRI of the chest with contrast.   CT examination performed with dose lowering protocol in accordance with ALARA.     Study: MRI breast bilateral   Date: 8/282/24  Report: I have read and agree with the radiologist report. and I have personally reviewed the imaging in PACS and my impression is as follows:  My impression is as follows:   Indeterminate 9 mm sternal lesion as detailed above. It appears that no prior CT or MRI of this region had been performed at our institution or at [facility] for comparison. Would confirm with the patient that she has not had CT or MRI of the chest. If no prior imaging has been performed, recommend dedicated CT for further evaluation.     Pathology & Pertinent Laboratory Findings:      N/A      Medical, Surgical, Family, and Social History      Past Medical History:   Diagnosis Date    Autoimmune disease (HCC)     Colon polyp     Digestive problems      Kidney stone     Prolactinoma (HCC)     Sjogren's syndrome (HCC) 3/15/19    Skin cancer      Past Surgical History:   Procedure Laterality Date    APPENDECTOMY      CYSTOSCOPY      FISSURECTOMY      KNEE ARTHROSCOPY Left     knee meniscus repair    FL COLONOSCOPY FLX DX W/COLLJ SPEC WHEN PFRMD N/A 9/22/2017    Procedure: COLONOSCOPY with polypectomy;  Surgeon: Devon Gil MD;  Location: [facility];  Service: Colorectal       Current Outpatient Medications:     Azelaic Acid (Finacea) 15 % cream, Apply topically 2 (two) times a day, Disp: , Rfl:     cetirizine (ZyrTEC) 10 mg tablet, Take by mouth, Disp: , Rfl:     clobetasol (TEMOVATE) 0.05 % external solution, , Disp: , Rfl:     cycloSPORINE (RESTASIS) 0.05 % ophthalmic emulsion, Restasis 0.05 % eye drops in a dropperette, Disp: , Rfl:     estradiol (ESTRACE) 0.1 mg/g vaginal cream, Insert 1 g into the vagina 2 (two) times a week, Disp: 42.5 g, Rfl: 0    multivitamin (THERAGRAN) TABS, Take 1 tablet by mouth daily, Disp: , Rfl:     pantoprazole (PROTONIX) 40 mg tablet, 1 (ONE) TABLET BY MOUTH BEFORE BREAKFAST, Disp: , Rfl:     sucralfate (CARAFATE) 1 g/10 mL suspension, , Disp: , Rfl:     Cholecalciferol 25 MCG (1000 UT) capsule, Take 1,000 Units by mouth daily (Patient not taking: Reported on 3/7/2024), Disp: , Rfl:     Collagen-Vitamin C-Biotin (COLLAGEN 1500/C PO), Take by mouth (Patient not taking: Reported on 4/25/2024), Disp: , Rfl:     famotidine (PEPCID) 40 MG tablet, , Disp: , Rfl:     Omega-3 1000 MG CAPS, Take 4 capsules by mouth daily (Patient not taking: Reported on 4/25/2024), Disp: , Rfl:     Probiotic Product (PROBIOTIC-10 PO), Take by mouth (Patient not taking: Reported on 4/25/2024), Disp: , Rfl:     TURMERIC PO, Take by mouth (Patient not taking: Reported on 4/25/2024), Disp: , Rfl:   Allergies   Allergen Reactions    Quaternium-15 Rash    Nuts - Food Allergy Other (See Comments)     Itchy mouth and throat    Other      Tree nuts    Soybean Oil -  Food Allergy      Family History   Problem Relation Age of Onset    Breast cancer Mother     Pulmonary fibrosis Father     Heart failure Sister     Rheum arthritis Sister     No Known Problems Brother     No Known Problems Brother     No Known Problems Son     Breast cancer additional onset Maternal Grandmother     Heart attack Maternal Grandmother     Breast cancer Maternal Grandmother     No Known Problems Maternal Grandfather     Heart disease Paternal Grandmother     No Known Problems Paternal Grandfather     Breast cancer Maternal Aunt     Colon cancer Neg Hx     Ovarian cancer Neg Hx     Uterine cancer Neg Hx     Cervical cancer Neg Hx      Social History     Socioeconomic History    Marital status: /Civil Union     Spouse name: Not on file    Number of children: Not on file    Years of education: Not on file    Highest education level: Not on file   Occupational History    Not on file   Tobacco Use    Smoking status: Never    Smokeless tobacco: Never   Vaping Use    Vaping status: Never Used   Substance and Sexual Activity    Alcohol use: Yes     Alcohol/week: 4.0 standard drinks of alcohol     Types: 3 Glasses of wine, 1 Shots of liquor per week     Comment: socially    Drug use: Never    Sexual activity: Yes     Partners: Male     Birth control/protection: Post-menopausal   Other Topics Concern    Not on file   Social History Narrative    Not on file     Social Determinants of Health     Financial Resource Strain: Not on file   Food Insecurity: Not on file   Transportation Needs: Not on file   Physical Activity: Not on file   Stress: Not on file   Social Connections: Not on file   Intimate Partner Violence: Not on file   Housing Stability: Not on file       This Visit:     30 minutes was spent in the coordination of care, reviewing of imaging and with the patient on the date of service    Scribe Attestation      I,:  Ray Mckeon PA-C am acting as a scribe while in the presence of the attending  physician.:       I,:  Paul Guy DO personally performed the services described in this documentation    as scribed in my presence.:              Ray Mckeon PA-C   10/10/2024 6:50 PM       Associated Orders:     Problem List Items Addressed This Visit    None  Visit Diagnoses       Neoplasm of uncertain behavior of bone and articular cartilage    -  Primary    Relevant Orders    NM bone scan whole body    MRI chest wall wo and w contrast             no

## 2024-11-04 ENCOUNTER — TELEPHONE (OUTPATIENT)
Age: 59
End: 2024-11-04

## 2024-11-08 ENCOUNTER — HOSPITAL ENCOUNTER (OUTPATIENT)
Dept: NUCLEAR MEDICINE | Facility: HOSPITAL | Age: 59
Discharge: HOME | End: 2024-11-08
Payer: COMMERCIAL

## 2024-11-08 DIAGNOSIS — D48.0 NEOPLASM OF UNCERTAIN BEHAVIOR OF BONE AND ARTICULAR CARTILAGE: ICD-10-CM

## 2024-11-08 PROCEDURE — A9503 TC99M MEDRONATE: HCPCS

## 2024-11-08 PROCEDURE — 78306 BONE IMAGING WHOLE BODY: CPT

## 2024-11-15 ENCOUNTER — RESULTS FOLLOW-UP (OUTPATIENT)
Dept: OBGYN CLINIC | Facility: MEDICAL CENTER | Age: 59
End: 2024-11-15

## 2024-11-15 ENCOUNTER — TELEPHONE (OUTPATIENT)
Dept: OBGYN CLINIC | Facility: HOSPITAL | Age: 59
End: 2024-11-15

## 2024-11-15 NOTE — TELEPHONE ENCOUNTER
Caller: Emmanuelle (Patient)    Doctor: Dr. Guy/Ray    Reason for call: Patient is looking for Bone scan results.     Call back#: [phone]

## 2025-01-07 ENCOUNTER — TELEPHONE (OUTPATIENT)
Age: 60
End: 2025-01-07

## 2025-01-07 DIAGNOSIS — Z12.31 ENCOUNTER FOR SCREENING MAMMOGRAM FOR BREAST CANCER: Primary | ICD-10-CM

## 2025-01-07 NOTE — TELEPHONE ENCOUNTER
RN placed updated mammogram screening order for patient, [facility] per [facility].     Call placed to patient with update. LVM with update (per communication consent).

## 2025-01-07 NOTE — TELEPHONE ENCOUNTER
Patient needs an updated mammogram order due to Dr. Holt retiring. Patient scheduled at [facility] [place].  Appointment Tues. January 14 at 7:45 a.m.    Please fax to [phone]    Thank you

## 2025-01-27 ENCOUNTER — TELEPHONE (OUTPATIENT)
Dept: OBGYN CLINIC | Facility: CLINIC | Age: 60
End: 2025-01-27

## 2025-01-27 DIAGNOSIS — N95.2 ATROPHIC VAGINITIS: ICD-10-CM

## 2025-01-27 DIAGNOSIS — N94.10 DYSPAREUNIA IN FEMALE: ICD-10-CM

## 2025-01-27 RX ORDER — ESTRADIOL 0.1 MG/G
1 CREAM VAGINAL 2 TIMES WEEKLY
Qty: 42.5 G | Refills: 0 | Status: SHIPPED | OUTPATIENT
Start: 2025-01-27

## 2025-01-27 NOTE — TELEPHONE ENCOUNTER
Reason for call:   [x] Refill   [] Prior Auth  [] Other:     Office:   [] PCP/Provider -   [x] Specialty/Provider - obsawyer/ Madhuri Yeager DO     Medication:     estradiol (ESTRACE) 0.1 mg/g vaginal cream       Dose/Frequency:     Insert 1 g into the vagina 2 (two) times a week       Quantity: 42.5g    Pharm[place] [id number] [place] [place] [address] [place] [address] [phone]     Does the patient have enough for 3 days?   [] Yes   [x] No - Send as HP to POD

## 2025-01-27 NOTE — TELEPHONE ENCOUNTER
Patient called the RX Refill Line. Message is being forwarded to the office.     Patient is requesting a call back with her mammo results.  She had them done at [facility] on 01/14/2025    Please contact patient at [phone]

## 2025-03-10 ENCOUNTER — TELEPHONE (OUTPATIENT)
Age: 60
End: 2025-03-10

## 2025-03-10 DIAGNOSIS — Z91.89 INCREASED RISK OF BREAST CANCER: Primary | ICD-10-CM

## 2025-03-10 NOTE — TELEPHONE ENCOUNTER
S/w patient to review message from Dr. Crawford. Reviewed would need to confirm MRI location is able to perform both test. Attempted transfer to central scheduling to review. Spoke with Daniela who states [place] location is able to perform both test and as they are the same locations (both chest) they can link the orders/tests to perform them at the same time/appointment. S/w patient to review - need MRI order entered and then she can call to schedule which will initiate prior auth process for Breast MRI. Patient verbalizes understanding.    Message to Dr. Yeager.

## 2025-03-10 NOTE — TELEPHONE ENCOUNTER
I'm honestly not sure- I would have her call the breast imaging center to see if they have an issue with this.

## 2025-03-10 NOTE — TELEPHONE ENCOUNTER
Patient called with question about having chest wall MRI ( Dr. ROBLES Guy ortho ordered for f/u sternal lesion)  scheduled 8/19/25 and her yearly breast MRI @ the same time?   Last breast MRI was 8/20/24.    Patient states she would change appointment for chest MRI if needed.  Patient asking for GYN provider input.  Inbasket sent to [facility] for follow up.

## 2025-05-23 ENCOUNTER — ANNUAL EXAM (OUTPATIENT)
Dept: OBGYN CLINIC | Facility: CLINIC | Age: 60
End: 2025-05-23

## 2025-05-23 VITALS
DIASTOLIC BLOOD PRESSURE: 78 MMHG | TEMPERATURE: 98.7 F | HEART RATE: 90 BPM | HEIGHT: 67 IN | BODY MASS INDEX: 27.03 KG/M2 | WEIGHT: 172.2 LBS | OXYGEN SATURATION: 99 % | SYSTOLIC BLOOD PRESSURE: 118 MMHG

## 2025-05-23 DIAGNOSIS — N95.2 ATROPHIC VAGINITIS: ICD-10-CM

## 2025-05-23 DIAGNOSIS — Z01.419 ENCOUNTER FOR ANNUAL ROUTINE GYNECOLOGICAL EXAMINATION: ICD-10-CM

## 2025-05-23 DIAGNOSIS — N94.10 DYSPAREUNIA IN FEMALE: ICD-10-CM

## 2025-05-23 DIAGNOSIS — Z80.3 FAMILY HISTORY OF BREAST CANCER: Primary | ICD-10-CM

## 2025-05-23 DIAGNOSIS — Z12.31 ENCOUNTER FOR SCREENING MAMMOGRAM FOR MALIGNANT NEOPLASM OF BREAST: ICD-10-CM

## 2025-05-23 RX ORDER — ESTRADIOL 0.1 MG/G
1 CREAM VAGINAL 2 TIMES WEEKLY
Qty: 42.5 G | Refills: 0 | Status: SHIPPED | OUTPATIENT
Start: 2025-05-26

## 2025-05-23 RX ORDER — MULTIVIT-MIN/IRON/FOLIC ACID/K 18-600-40
CAPSULE ORAL
COMMUNITY

## 2025-05-23 NOTE — ASSESSMENT & PLAN NOTE
Orders:    estradiol (ESTRACE) 0.1 mg/g vaginal cream; Insert 1 g into the vagina 2 (two) times a week

## 2025-05-23 NOTE — PROGRESS NOTES
Annual Wellness Visit  Name: Emmanuelle White      : 1965      MRN: [id number]  Encounter Provider: Madhuri Yeager DO  Encounter Date: 2025   Encounter department: [facility]    59 y.o.  yo presents today for her annual exam.:  Assessment & Plan  Encounter for annual routine gynecological examination  - Pap up to date. Reviewed ASCCP guidelines for pap smear.   - diet and exercise reviewed  - has supplemental screening for breast cancer due to elevated tyrer anuel  - declines STI testing       Encounter for screening mammogram for malignant neoplasm of breast    Orders:    Mammo screening bilateral w 3d and cad; Future    Atrophic vaginitis    Orders:    estradiol (ESTRACE) 0.1 mg/g vaginal cream; Insert 1 g into the vagina 2 (two) times a week    Dyspareunia in female    Orders:    estradiol (ESTRACE) 0.1 mg/g vaginal cream; Insert 1 g into the vagina 2 (two) times a week    Family history of breast cancer  Reviewed strong fam hx of breast cancer-  mother, maternal aunt, maternal GM. No genetic testing  Desires referral, will consider testing/implications    Orders:    Ambulatory Referral to Genetics; Future             History of Present Illness     Emmanuelle White is a 59 y.o. female who presents for annual well woman exam.    GYN:  Denies vaginal discharge, labial erythema or lesions, dyspareunia.  She is postmenopausal without bleeding. Menopause at age 54.  Contraception: menoapuse.  Patient is sexually active with male partner. Uses estradiol cream for vaginal dryness.   Denies gynecologic surgeries.  Gardasil - n/a  Estrace cream helping with atrophy, uses clobetasol once weekly for lichen, asymptomatic. Also sees derm for lichen    OB:   female  Pregnancies were .    :  Denies dysuria, urinary frequency or urgency.  Denies hematuria, flank pain, incontinence.  Denies constipation, diarrhea  Diagnosed with celiac disease 5  "years ago. Doing much better     Breast:  denies breast mass, skin changes, dimpling, reddening, nipple retraction.  Denies breast discharge.  Patient does have a family history of breast, endometrial, or ovarian ca.     General:  Diet: Reviewed dietary calcium recommendations  Exercise: Reviewed recommendation of 150 minutes of moderate intensity exercise per week  ETOH use: social  Tobacco use: denies  Recreational drug use: denies    Screening:  Cervical cancer: last pap smear in 2022. Results were NILM, hpv neg.  Breast cancer: last mammogram in Jan 2025. Results were BIRADS 2.  Colon cancer: last colonoscopy in 2022. 5 yr recall.  STD screening: declines.  DEXA due in July, ordered by [facility]      Review of Systems as per [facility]  Past Medical History   Past Medical History[1]  Past Surgical History[2]  Family History[3]   reports that she has never smoked. She has never used smokeless tobacco. She reports current alcohol use of about 4.0 standard drinks of alcohol per week. She reports that she does not use drugs.  Current Outpatient Medications   Medication Instructions    Azelaic Acid (Finacea) 15 % cream 2 times daily    Calcium Carb-Cholecalciferol (CALCIUM 600/VITAMIN D3 PO) 600 mg, Daily    cetirizine (ZyrTEC) 10 mg tablet Take by mouth    clobetasol (TEMOVATE) 0.05 % external solution No dose, route, or frequency recorded.    cycloSPORINE (RESTASIS) 0.05 % ophthalmic emulsion     [START ON 5/26/2025] estradiol (ESTRACE) 1 g, Vaginal, 2 times weekly    Iron (Ferrous Sulfate) 325 mg, Oral, Daily    Multiple Vitamins-Minerals (Multi For Her 50+) CAPS Take by mouth    multivitamin (THERAGRAN) TABS 1 tablet, Daily    Omega-3 1000 MG CAPS 4 capsules, Daily    pantoprazole (PROTONIX) 40 mg tablet     sucralfate (CARAFATE) 1 g/10 mL suspension    Allergies[4]      Objective   /78 (Patient Position: Sitting, Cuff Size: Adult)   Pulse 90   Temp 98.7 °F (37.1 °C) (Temporal)   Ht 5' 7\" (1.702 m)   Wt 78.1 kg " (172 lb 3.2 oz)   LMP 04/10/2019   SpO2 99%   BMI 26.97 kg/m²      Physical Exam  Vitals and nursing note reviewed.   Constitutional:       General: She is not in acute distress.     Appearance: She is well-developed.   HENT:      Head: Normocephalic and atraumatic.      Mouth/Throat:      Mouth: Mucous membranes are moist.     Eyes:      Conjunctiva/sclera: Conjunctivae normal.       Cardiovascular:      Rate and Rhythm: Normal rate and regular rhythm.      Heart sounds: No murmur heard.  Pulmonary:      Effort: Pulmonary effort is normal. No respiratory distress.      Breath sounds: Normal breath sounds.   Chest:   Breasts:     Right: No inverted nipple, mass, nipple discharge, skin change or tenderness.      Left: No inverted nipple, mass, nipple discharge, skin change or tenderness.   Abdominal:      General: There is no distension.      Palpations: Abdomen is soft.      Tenderness: There is no abdominal tenderness.   Genitourinary:     Labia:         Right: No rash, tenderness or lesion.         Left: No rash, tenderness or lesion.       Vagina: No vaginal discharge.      Cervix: No cervical motion tenderness or friability.      Uterus: Not enlarged and not tender.       Adnexa:         Right: No tenderness or fullness.          Left: No tenderness or fullness.       Musculoskeletal:         General: No swelling.      Cervical back: Neck supple.   Lymphadenopathy:      Upper Body:      Right upper body: No axillary adenopathy.      Left upper body: No axillary adenopathy.     Skin:     General: Skin is warm and dry.      Capillary Refill: Capillary refill takes less than 2 seconds.     Neurological:      Mental Status: She is alert.     Psychiatric:         Mood and Affect: Mood normal.         Behavior: Behavior normal.                  [1]   Past Medical History:  Diagnosis Date    Autoimmune disease (HCC)     Colon polyp     Digestive problems     Kidney stone     Prolactinoma (HCC)     Sjogren's syndrome  (HCC) 3/15/19    Skin cancer    [2]   Past Surgical History:  Procedure Laterality Date    APPENDECTOMY      CYSTOSCOPY      FISSURECTOMY      KNEE ARTHROSCOPY Left     knee meniscus repair    ME COLONOSCOPY FLX DX W/COLLJ SPEC WHEN PFRMD N/A 9/22/2017    Procedure: COLONOSCOPY with polypectomy;  Surgeon: Devon Gil MD;  Location: [facility];  Service: Colorectal   [3]   Family History  Problem Relation Name Age of Onset    Breast cancer Mother Joanne Chavis     Pulmonary fibrosis Father      Heart failure Sister Gloria     Rheum arthritis Sister Gloria     No Known Problems Brother      No Known Problems Brother      No Known Problems Son      Breast cancer additional onset Maternal Grandmother Jodee K.     Heart attack Maternal Grandmother Jodee K.     Breast cancer Maternal Grandmother Jodee K.     No Known Problems Maternal Grandfather      Heart disease Paternal Grandmother Astrid     No Known Problems Paternal Grandfather      Breast cancer Maternal Aunt      Colon cancer Neg Hx      Ovarian cancer Neg Hx      Uterine cancer Neg Hx      Cervical cancer Neg Hx     [4]   Allergies  Allergen Reactions    Quaternium-15 Rash    Nuts - Food Allergy Other (See Comments)     Itchy mouth and throat    Other      Tree nuts    Soybean Oil - Food Allergy     Wound Dressings Dermatitis

## (undated) DEVICE — SINGLE-USE POLYPECTOMY SNARE: Brand: ROTATABLE SNARE